# Patient Record
Sex: FEMALE | Race: BLACK OR AFRICAN AMERICAN | NOT HISPANIC OR LATINO
[De-identification: names, ages, dates, MRNs, and addresses within clinical notes are randomized per-mention and may not be internally consistent; named-entity substitution may affect disease eponyms.]

---

## 2017-01-19 ENCOUNTER — APPOINTMENT (OUTPATIENT)
Dept: OBGYN | Facility: CLINIC | Age: 22
End: 2017-01-19

## 2017-01-19 VITALS
BODY MASS INDEX: 20.06 KG/M2 | WEIGHT: 109 LBS | RESPIRATION RATE: 18 BRPM | DIASTOLIC BLOOD PRESSURE: 62 MMHG | OXYGEN SATURATION: 99 % | HEIGHT: 62 IN | HEART RATE: 97 BPM | SYSTOLIC BLOOD PRESSURE: 106 MMHG

## 2017-02-09 ENCOUNTER — APPOINTMENT (OUTPATIENT)
Dept: OBGYN | Facility: CLINIC | Age: 22
End: 2017-02-09

## 2017-04-07 ENCOUNTER — APPOINTMENT (OUTPATIENT)
Dept: OBGYN | Facility: CLINIC | Age: 22
End: 2017-04-07

## 2017-04-07 VITALS
BODY MASS INDEX: 20.43 KG/M2 | HEART RATE: 75 BPM | HEIGHT: 62 IN | DIASTOLIC BLOOD PRESSURE: 58 MMHG | WEIGHT: 111 LBS | OXYGEN SATURATION: 99 % | SYSTOLIC BLOOD PRESSURE: 90 MMHG

## 2017-04-07 DIAGNOSIS — N91.1 SECONDARY AMENORRHEA: ICD-10-CM

## 2017-04-07 LAB
BILIRUB UR QL STRIP: NORMAL
CLARITY UR: NORMAL
COLLECTION METHOD: NORMAL
GLUCOSE UR-MCNC: NORMAL
HCG UR QL: 0.2 EU/DL
HCG UR QL: NEGATIVE
HGB UR QL STRIP.AUTO: NORMAL
KETONES UR-MCNC: NORMAL
LEUKOCYTE ESTERASE UR QL STRIP: NORMAL
NITRITE UR QL STRIP: NORMAL
PH UR STRIP: 6.5
PROT UR STRIP-MCNC: NORMAL
QUALITY CONTROL: NO
SP GR UR STRIP: 1.02

## 2017-04-10 LAB
C TRACH RRNA SPEC QL NAA+PROBE: NORMAL
N GONORRHOEA RRNA SPEC QL NAA+PROBE: NORMAL
SOURCE AMPLIFICATION: NORMAL

## 2018-07-25 ENCOUNTER — APPOINTMENT (OUTPATIENT)
Dept: OBGYN | Facility: CLINIC | Age: 23
End: 2018-07-25

## 2018-08-02 ENCOUNTER — APPOINTMENT (OUTPATIENT)
Dept: OBGYN | Facility: CLINIC | Age: 23
End: 2018-08-02
Payer: COMMERCIAL

## 2018-08-02 VITALS
OXYGEN SATURATION: 99 % | DIASTOLIC BLOOD PRESSURE: 56 MMHG | BODY MASS INDEX: 20.8 KG/M2 | SYSTOLIC BLOOD PRESSURE: 90 MMHG | WEIGHT: 113 LBS | HEART RATE: 79 BPM | HEIGHT: 62 IN

## 2018-08-02 DIAGNOSIS — Z87.440 PERSONAL HISTORY OF URINARY (TRACT) INFECTIONS: ICD-10-CM

## 2018-08-02 DIAGNOSIS — Z30.40 ENCOUNTER FOR SURVEILLANCE OF CONTRACEPTIVES, UNSPECIFIED: ICD-10-CM

## 2018-08-02 DIAGNOSIS — Z20.2 CONTACT WITH AND (SUSPECTED) EXPOSURE TO INFECTIONS WITH A PREDOMINANTLY SEXUAL MODE OF TRANSMISSION: ICD-10-CM

## 2018-08-02 DIAGNOSIS — Z87.898 PERSONAL HISTORY OF OTHER SPECIFIED CONDITIONS: ICD-10-CM

## 2018-08-02 DIAGNOSIS — Z30.9 ENCOUNTER FOR CONTRACEPTIVE MANAGEMENT, UNSPECIFIED: ICD-10-CM

## 2018-08-02 DIAGNOSIS — Z87.42 PERSONAL HISTORY OF OTHER DISEASES OF THE FEMALE GENITAL TRACT: ICD-10-CM

## 2018-08-02 DIAGNOSIS — Z01.419 ENCOUNTER FOR GYNECOLOGICAL EXAMINATION (GENERAL) (ROUTINE) W/OUT ABNORMAL FINDINGS: ICD-10-CM

## 2018-08-02 PROCEDURE — 99395 PREV VISIT EST AGE 18-39: CPT | Mod: 25

## 2018-08-02 PROCEDURE — 36415 COLL VENOUS BLD VENIPUNCTURE: CPT

## 2018-08-03 LAB
HBV SURFACE AG SER QL: NONREACTIVE
HIV1+2 AB SPEC QL IA.RAPID: NONREACTIVE
PROLACTIN SERPL-MCNC: 7.5 NG/ML
T PALLIDUM AB SER QL IA: NEGATIVE
TSH SERPL-ACNC: 0.74 UIU/ML

## 2018-08-05 LAB
C TRACH RRNA SPEC QL NAA+PROBE: NOT DETECTED
N GONORRHOEA RRNA SPEC QL NAA+PROBE: NOT DETECTED
SOURCE TP AMPLIFICATION: NORMAL

## 2018-08-08 LAB — CYTOLOGY CVX/VAG DOC THIN PREP: NORMAL

## 2018-12-06 ENCOUNTER — RESULT CHARGE (OUTPATIENT)
Age: 23
End: 2018-12-06

## 2018-12-06 ENCOUNTER — TRANSCRIPTION ENCOUNTER (OUTPATIENT)
Age: 23
End: 2018-12-06

## 2018-12-06 ENCOUNTER — APPOINTMENT (OUTPATIENT)
Dept: OBGYN | Facility: CLINIC | Age: 23
End: 2018-12-06
Payer: COMMERCIAL

## 2018-12-06 VITALS
HEIGHT: 62 IN | OXYGEN SATURATION: 96 % | HEART RATE: 67 BPM | SYSTOLIC BLOOD PRESSURE: 112 MMHG | WEIGHT: 112 LBS | TEMPERATURE: 97.6 F | DIASTOLIC BLOOD PRESSURE: 60 MMHG | BODY MASS INDEX: 20.61 KG/M2

## 2018-12-06 DIAGNOSIS — Z11.3 ENCOUNTER FOR SCREENING FOR INFECTIONS WITH A PREDOMINANTLY SEXUAL MODE OF TRANSMISSION: ICD-10-CM

## 2018-12-06 PROCEDURE — 36415 COLL VENOUS BLD VENIPUNCTURE: CPT

## 2018-12-06 PROCEDURE — 99213 OFFICE O/P EST LOW 20 MIN: CPT | Mod: 25

## 2018-12-07 LAB
HBV SURFACE AG SER QL: NONREACTIVE
HIV1+2 AB SPEC QL IA.RAPID: NONREACTIVE
T PALLIDUM AB SER QL IA: NEGATIVE

## 2018-12-08 LAB
C TRACH RRNA SPEC QL NAA+PROBE: NOT DETECTED
N GONORRHOEA RRNA SPEC QL NAA+PROBE: NOT DETECTED
SOURCE AMPLIFICATION: NORMAL

## 2019-01-30 ENCOUNTER — APPOINTMENT (OUTPATIENT)
Dept: OBGYN | Facility: CLINIC | Age: 24
End: 2019-01-30
Payer: COMMERCIAL

## 2019-01-30 VITALS
WEIGHT: 110.6 LBS | BODY MASS INDEX: 20.35 KG/M2 | HEIGHT: 62 IN | DIASTOLIC BLOOD PRESSURE: 66 MMHG | OXYGEN SATURATION: 98 % | HEART RATE: 68 BPM | SYSTOLIC BLOOD PRESSURE: 110 MMHG | TEMPERATURE: 98.5 F

## 2019-01-30 PROCEDURE — 99213 OFFICE O/P EST LOW 20 MIN: CPT | Mod: 25

## 2019-01-30 PROCEDURE — 36415 COLL VENOUS BLD VENIPUNCTURE: CPT

## 2019-01-30 RX ORDER — NORETHINDRONE ACETATE AND ETHINYL ESTRADIOL 1; .02 MG/1; MG/1
1-20 TABLET ORAL
Qty: 1 | Refills: 3 | Status: DISCONTINUED | COMMUNITY
Start: 2018-12-12 | End: 2019-01-30

## 2019-01-31 LAB
APTT BLD: 34.9 SEC
BASOPHILS # BLD AUTO: 0.03 K/UL
BASOPHILS NFR BLD AUTO: 0.8 %
EOSINOPHIL # BLD AUTO: 0.05 K/UL
EOSINOPHIL NFR BLD AUTO: 1.4 %
HCT VFR BLD CALC: 34.2 %
HGB BLD-MCNC: 10.8 G/DL
IMM GRANULOCYTES NFR BLD AUTO: 0.3 %
INR PPP: 1.04 RATIO
LYMPHOCYTES # BLD AUTO: 1.5 K/UL
LYMPHOCYTES NFR BLD AUTO: 41.9 %
MAN DIFF?: NORMAL
MCHC RBC-ENTMCNC: 30.1 PG
MCHC RBC-ENTMCNC: 31.6 GM/DL
MCV RBC AUTO: 95.3 FL
MONOCYTES # BLD AUTO: 0.37 K/UL
MONOCYTES NFR BLD AUTO: 10.3 %
NEUTROPHILS # BLD AUTO: 1.62 K/UL
NEUTROPHILS NFR BLD AUTO: 45.3 %
PLATELET # BLD AUTO: 223 K/UL
PT BLD: 11.9 SEC
RBC # BLD: 3.59 M/UL
RBC # FLD: 13.8 %
WBC # FLD AUTO: 3.58 K/UL

## 2019-03-11 ENCOUNTER — OUTPATIENT (OUTPATIENT)
Dept: OUTPATIENT SERVICES | Facility: HOSPITAL | Age: 24
LOS: 1 days | End: 2019-03-11
Payer: COMMERCIAL

## 2019-03-11 ENCOUNTER — APPOINTMENT (OUTPATIENT)
Dept: ULTRASOUND IMAGING | Facility: HOSPITAL | Age: 24
End: 2019-03-11
Payer: COMMERCIAL

## 2019-03-11 DIAGNOSIS — N93.9 ABNORMAL UTERINE AND VAGINAL BLEEDING, UNSPECIFIED: ICD-10-CM

## 2019-03-11 PROCEDURE — 76830 TRANSVAGINAL US NON-OB: CPT | Mod: 26

## 2019-03-11 PROCEDURE — 76856 US EXAM PELVIC COMPLETE: CPT | Mod: 26

## 2019-03-11 PROCEDURE — 76856 US EXAM PELVIC COMPLETE: CPT

## 2019-03-11 PROCEDURE — 76830 TRANSVAGINAL US NON-OB: CPT

## 2019-03-14 ENCOUNTER — TRANSCRIPTION ENCOUNTER (OUTPATIENT)
Age: 24
End: 2019-03-14

## 2019-03-20 ENCOUNTER — APPOINTMENT (OUTPATIENT)
Dept: OBGYN | Facility: CLINIC | Age: 24
End: 2019-03-20
Payer: COMMERCIAL

## 2019-03-20 VITALS
OXYGEN SATURATION: 98 % | DIASTOLIC BLOOD PRESSURE: 54 MMHG | WEIGHT: 110 LBS | TEMPERATURE: 98.1 F | BODY MASS INDEX: 20.24 KG/M2 | SYSTOLIC BLOOD PRESSURE: 102 MMHG | HEART RATE: 72 BPM | HEIGHT: 62 IN

## 2019-03-20 DIAGNOSIS — Z30.09 ENCOUNTER FOR OTHER GENERAL COUNSELING AND ADVICE ON CONTRACEPTION: ICD-10-CM

## 2019-03-20 PROCEDURE — 99213 OFFICE O/P EST LOW 20 MIN: CPT

## 2019-03-29 PROBLEM — Z30.09 BIRTH CONTROL COUNSELING: Status: ACTIVE | Noted: 2019-03-29

## 2019-09-30 ENCOUNTER — APPOINTMENT (OUTPATIENT)
Dept: OBGYN | Facility: CLINIC | Age: 24
End: 2019-09-30
Payer: COMMERCIAL

## 2019-09-30 VITALS — TEMPERATURE: 97.8 F | WEIGHT: 115 LBS | HEIGHT: 62 IN | HEART RATE: 70 BPM | BODY MASS INDEX: 21.16 KG/M2

## 2019-09-30 DIAGNOSIS — N76.0 ACUTE VAGINITIS: ICD-10-CM

## 2019-09-30 PROCEDURE — 99213 OFFICE O/P EST LOW 20 MIN: CPT

## 2019-09-30 RX ORDER — NORGESTIMATE AND ETHINYL ESTRADIOL 0.25-0.035
0.25-35 KIT ORAL
Qty: 1 | Refills: 0 | Status: COMPLETED | COMMUNITY
Start: 2019-01-30 | End: 2019-09-30

## 2019-10-01 NOTE — PHYSICAL EXAM
[Normal] : uterus [No Bleeding] : there was no active vaginal bleeding [Discharge] : had a ~M discharge [Moderate] : moderate [White] : white [Cheesy] : cheesy [Uterine Adnexae] : were not tender and not enlarged

## 2019-10-02 LAB
C TRACH RRNA SPEC QL NAA+PROBE: NOT DETECTED
CANDIDA VAG CYTO: DETECTED
G VAGINALIS+PREV SP MTYP VAG QL MICRO: DETECTED
N GONORRHOEA RRNA SPEC QL NAA+PROBE: NOT DETECTED
SOURCE AMPLIFICATION: NORMAL
T VAGINALIS VAG QL WET PREP: NOT DETECTED

## 2020-02-11 ENCOUNTER — APPOINTMENT (OUTPATIENT)
Dept: OBGYN | Facility: CLINIC | Age: 25
End: 2020-02-11
Payer: COMMERCIAL

## 2020-02-11 VITALS
TEMPERATURE: 98.1 F | SYSTOLIC BLOOD PRESSURE: 87 MMHG | OXYGEN SATURATION: 99 % | BODY MASS INDEX: 21.16 KG/M2 | WEIGHT: 115 LBS | HEIGHT: 62 IN | HEART RATE: 70 BPM | DIASTOLIC BLOOD PRESSURE: 58 MMHG

## 2020-02-11 DIAGNOSIS — B96.89 ACUTE VAGINITIS: ICD-10-CM

## 2020-02-11 DIAGNOSIS — N93.9 ABNORMAL UTERINE AND VAGINAL BLEEDING, UNSPECIFIED: ICD-10-CM

## 2020-02-11 DIAGNOSIS — L29.2 PRURITUS VULVAE: ICD-10-CM

## 2020-02-11 DIAGNOSIS — N76.0 ACUTE VAGINITIS: ICD-10-CM

## 2020-02-11 PROCEDURE — 99213 OFFICE O/P EST LOW 20 MIN: CPT

## 2020-02-11 RX ORDER — METRONIDAZOLE 7.5 MG/G
0.75 GEL VAGINAL
Qty: 1 | Refills: 1 | Status: COMPLETED | COMMUNITY
Start: 2019-10-02 | End: 2020-02-11

## 2020-02-11 RX ORDER — PROMETHAZINE HYDROCHLORIDE 12.5 MG/1
12.5 SUPPOSITORY RECTAL EVERY 6 HOURS
Qty: 10 | Refills: 0 | Status: COMPLETED | COMMUNITY
Start: 2019-01-30 | End: 2020-02-11

## 2020-02-11 RX ORDER — TERCONAZOLE 8 MG/G
0.8 CREAM VAGINAL
Qty: 1 | Refills: 2 | Status: DISCONTINUED | COMMUNITY
Start: 2019-09-30 | End: 2020-02-11

## 2020-02-11 NOTE — CHIEF COMPLAINT
[Initial Visit] : initial GYN visit [FreeTextEntry1] : came c/o foul smelling vaginal discharge for 2 months.irregular periods

## 2020-02-11 NOTE — HISTORY OF PRESENT ILLNESS
[Irregular Menses] : irregular menses [___ Months] : began [unfilled] months ago [Moderate Bleeding] : described as moderate in severity [Pain] : no pelvic pain [Definite:  ___ (Date)] : the last menstrual period was [unfilled] [Normal Amount/Duration] : was abnormal [Menarche Age: ____] : age at menarche was [unfilled] [Regular Cycle Intervals] : periods have been irregular [On BCP at conception] : the patient was not on BCP at conception [Sexually Active] : is sexually active [Male ___] : [unfilled] male

## 2020-02-11 NOTE — PHYSICAL EXAM
[Examination Of The Breasts] : a normal appearance [Normal] : normal [No Masses] : no breast masses were palpable [Moderate] : moderate [Discharge] : had a ~M discharge [White] : white [Foul Smelling] : foul smelling [Cheesy] : cheesy [Pap Obtained] : a Pap smear was performed

## 2020-02-13 RX ORDER — NORGESTIMATE AND ETHINYL ESTRADIOL 0.25-0.035
0.25-35 KIT ORAL DAILY
Qty: 1 | Refills: 11 | Status: COMPLETED | COMMUNITY
Start: 2019-01-30 | End: 2020-02-13

## 2020-06-03 RX ORDER — METRONIDAZOLE 500 MG/1
500 TABLET ORAL TWICE DAILY
Qty: 14 | Refills: 3 | Status: COMPLETED | COMMUNITY
Start: 2020-02-11 | End: 2020-06-03

## 2020-06-03 RX ORDER — NORGESTIMATE AND ETHINYL ESTRADIOL 7DAYSX3 28
0.18/0.215/0.25 KIT ORAL DAILY
Qty: 1 | Refills: 5 | Status: COMPLETED | COMMUNITY
Start: 2020-02-11 | End: 2020-06-03

## 2020-12-21 PROBLEM — N76.0 ACUTE VAGINITIS: Status: RESOLVED | Noted: 2019-09-30 | Resolved: 2020-12-21

## 2020-12-23 PROBLEM — N76.0 BACTERIAL VAGINOSIS: Status: RESOLVED | Noted: 2020-02-11 | Resolved: 2020-12-23

## 2021-04-29 ENCOUNTER — APPOINTMENT (OUTPATIENT)
Dept: OBGYN | Facility: CLINIC | Age: 26
End: 2021-04-29
Payer: SELF-PAY

## 2021-04-29 VITALS — TEMPERATURE: 98.4 F | HEIGHT: 62 IN | OXYGEN SATURATION: 99 % | WEIGHT: 124 LBS | BODY MASS INDEX: 22.82 KG/M2

## 2021-04-29 VITALS — HEART RATE: 71 BPM | SYSTOLIC BLOOD PRESSURE: 118 MMHG | DIASTOLIC BLOOD PRESSURE: 69 MMHG

## 2021-04-29 DIAGNOSIS — N92.6 IRREGULAR MENSTRUATION, UNSPECIFIED: ICD-10-CM

## 2021-04-29 DIAGNOSIS — Z01.419 ENCOUNTER FOR GYNECOLOGICAL EXAMINATION (GENERAL) (ROUTINE) W/OUT ABNORMAL FINDINGS: ICD-10-CM

## 2021-04-29 PROCEDURE — 99072 ADDL SUPL MATRL&STAF TM PHE: CPT

## 2021-04-29 PROCEDURE — 81025 URINE PREGNANCY TEST: CPT

## 2021-04-29 PROCEDURE — 99395 PREV VISIT EST AGE 18-39: CPT | Mod: 25

## 2021-04-29 RX ORDER — FLUCONAZOLE 150 MG/1
150 TABLET ORAL
Qty: 2 | Refills: 2 | Status: COMPLETED | COMMUNITY
Start: 2020-02-11 | End: 2021-04-29

## 2021-04-29 RX ORDER — MEDROXYPROGESTERONE ACETATE 10 MG/1
10 TABLET ORAL DAILY
Qty: 7 | Refills: 2 | Status: ACTIVE | COMMUNITY
Start: 2021-04-29 | End: 1900-01-01

## 2021-04-29 RX ORDER — NORGESTIMATE AND ETHINYL ESTRADIOL 7DAYSX3 28
0.18/0.215/0.25 KIT ORAL DAILY
Qty: 1 | Refills: 5 | Status: DISCONTINUED | COMMUNITY
Start: 2020-06-03 | End: 2021-04-29

## 2021-04-29 NOTE — HISTORY OF PRESENT ILLNESS
[FreeTextEntry1] : 01/23/2021 [Currently Active] : currently active [Men] : men [Vaginal] : vaginal [No] : No [Irregular Menses] : irregular menses [Moderate Bleeding] : described as moderate in severity [___ Months] : began [unfilled] months ago [Pain] : no pelvic pain [Definite:  ___ (Date)] : the last menstrual period was [unfilled] [Normal Amount/Duration] : was abnormal [Regular Cycle Intervals] : periods have been irregular [Menarche Age: ____] : age at menarche was [unfilled] [On BCP at conception] : the patient was not on BCP at conception [Sexually Active] : is sexually active [Male ___] : [unfilled] male

## 2021-04-29 NOTE — PHYSICAL EXAM
[Appropriately responsive] : appropriately responsive [Alert] : alert [No Acute Distress] : no acute distress [No Lymphadenopathy] : no lymphadenopathy [Regular Rate Rhythm] : regular rate rhythm [No Murmurs] : no murmurs [Clear to Auscultation B/L] : clear to auscultation bilaterally [Soft] : soft [Non-tender] : non-tender [Non-distended] : non-distended [No HSM] : No HSM [No Lesions] : no lesions [No Mass] : no mass [Oriented x3] : oriented x3 [Normal Position] : in a normal position [Uterine Adnexae] : normal [Examination Of The Breasts] : a normal appearance [Normal] : normal [No Masses] : no breast masses were palpable [Discharge] : had a ~M discharge [Moderate] : moderate [Foul Smelling] : foul smelling [White] : white [Cheesy] : cheesy [Pap Obtained] : a Pap smear was performed

## 2021-05-04 LAB — CYTOLOGY CVX/VAG DOC THIN PREP: NORMAL

## 2021-08-16 ENCOUNTER — APPOINTMENT (OUTPATIENT)
Dept: OBGYN | Facility: CLINIC | Age: 26
End: 2021-08-16